# Patient Record
Sex: MALE | Race: AMERICAN INDIAN OR ALASKA NATIVE | URBAN - METROPOLITAN AREA
[De-identification: names, ages, dates, MRNs, and addresses within clinical notes are randomized per-mention and may not be internally consistent; named-entity substitution may affect disease eponyms.]

---

## 2022-10-26 ENCOUNTER — OFFICE VISIT (OUTPATIENT)
Facility: LOCATION | Age: 52
End: 2022-10-26
Payer: OTHER GOVERNMENT

## 2022-10-26 DIAGNOSIS — H25.13 AGE-RELATED NUCLEAR CATARACT, BILATERAL: Primary | ICD-10-CM

## 2022-10-26 DIAGNOSIS — E11.3313 TYPE 2 DIAB WITH MODERATE NONP RTNOP WITH MACULAR EDEMA, BI: ICD-10-CM

## 2022-10-26 DIAGNOSIS — H35.033 HYPERTENSIVE RETINOPATHY, BILATERAL: ICD-10-CM

## 2022-10-26 PROCEDURE — 67210 TREATMENT OF RETINAL LESION: CPT | Performed by: OPHTHALMOLOGY

## 2022-10-26 PROCEDURE — 92004 COMPRE OPH EXAM NEW PT 1/>: CPT | Performed by: OPHTHALMOLOGY

## 2022-10-26 PROCEDURE — 92134 CPTRZ OPH DX IMG PST SGM RTA: CPT | Performed by: OPHTHALMOLOGY

## 2022-10-26 ASSESSMENT — INTRAOCULAR PRESSURE
OD: 18
OS: 20

## 2022-10-26 NOTE — IMPRESSION/PLAN
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
- last A1C ~6
- also with HTN Plan: Exam and OCT reveal mild-mod NPDR with tr DME. The diagnosis, natural history, and prognosis of DR were discussed at length. The importance of blood sugar, blood pressure, and cholesterol control and their relationship to progression of diabetic retinopathy were reviewed. The presence of DME is a threat to vision and requires treatment. We discussed that treatment and stabilization of the DME often requires the combination of antiVEGF injections and laser treatment. Recommend focal laser OU today. R/B/A discussed and patient elects to proceed. Focal laser was administered OU in clinic without complication.   

RTC 6 mo with OCT OU

## 2022-10-26 NOTE — IMPRESSION/PLAN
Impression: Hypertensive retinopathy, bilateral: H35.033. Plan: likely contributing to DR. BP control discussed.

## 2023-05-24 ENCOUNTER — OFFICE VISIT (OUTPATIENT)
Facility: LOCATION | Age: 53
End: 2023-05-24
Payer: OTHER GOVERNMENT

## 2023-05-24 DIAGNOSIS — H25.13 AGE-RELATED NUCLEAR CATARACT, BILATERAL: ICD-10-CM

## 2023-05-24 DIAGNOSIS — H35.033 HYPERTENSIVE RETINOPATHY, BILATERAL: ICD-10-CM

## 2023-05-24 DIAGNOSIS — E11.3313 TYPE 2 DIABETES MELLITUS WITH MODERATE NONPROLIFERATIVE DIABETIC RETINOPATHY WITH MACULAR EDEMA, BILATERAL: Primary | ICD-10-CM

## 2023-05-24 PROCEDURE — 92134 CPTRZ OPH DX IMG PST SGM RTA: CPT | Performed by: OPHTHALMOLOGY

## 2023-05-24 PROCEDURE — 92014 COMPRE OPH EXAM EST PT 1/>: CPT | Performed by: OPHTHALMOLOGY

## 2023-05-24 ASSESSMENT — INTRAOCULAR PRESSURE
OS: 16
OD: 19

## 2023-05-24 NOTE — IMPRESSION/PLAN
Impression: Age-related nuclear cataract, bilateral: H25.13. Plan: Patient notes glare. Exam demonstrates a mild-moderate cataract. Discussed R/B/A of surgery vs observation. Recommend observation. Warning symptoms discussed. [employer] uploaded at home:

## 2023-05-24 NOTE — IMPRESSION/PLAN
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
- last A1C ~6
- also with HTN
-s/p Focal OU 10/26/2022-SI Plan: Exam and OCT reveal mild-mod NPDR with improved DME. The diagnosis, natural history, and prognosis of DR were discussed at length. The importance of blood sugar, blood pressure, and cholesterol control and their relationship to progression of diabetic retinopathy were reviewed. The presence of DME is a threat to vision and requires treatment. We discussed that treatment and stabilization of the DME often requires the combination of antiVEGF injections and laser treatment. Recommend observation OU today. R/B/A discussed and patient elects to proceed.  

RTC 12 mo with OCT OU